# Patient Record
Sex: FEMALE | Race: WHITE | NOT HISPANIC OR LATINO | URBAN - METROPOLITAN AREA
[De-identification: names, ages, dates, MRNs, and addresses within clinical notes are randomized per-mention and may not be internally consistent; named-entity substitution may affect disease eponyms.]

---

## 2019-04-29 ENCOUNTER — INPATIENT (INPATIENT)
Facility: HOSPITAL | Age: 44
LOS: 0 days | Discharge: HOME | DRG: 74 | End: 2019-04-30
Attending: INTERNAL MEDICINE | Admitting: HOSPITALIST
Payer: SELF-PAY

## 2019-04-29 VITALS
SYSTOLIC BLOOD PRESSURE: 172 MMHG | OXYGEN SATURATION: 98 % | HEIGHT: 58 IN | HEART RATE: 102 BPM | RESPIRATION RATE: 18 BRPM | WEIGHT: 125 LBS | TEMPERATURE: 99 F | DIASTOLIC BLOOD PRESSURE: 91 MMHG

## 2019-04-29 PROCEDURE — 99285 EMERGENCY DEPT VISIT HI MDM: CPT | Mod: 25

## 2019-04-29 PROCEDURE — 99053 MED SERV 10PM-8AM 24 HR FAC: CPT

## 2019-04-29 NOTE — ED ADULT TRIAGE NOTE - CHIEF COMPLAINT QUOTE
Patient A&Ox4 complaining of facial droop & dysphagia x1 day. Patient presents with right sided facial droop, Dr. Peterson called to bedside for eval.

## 2019-04-30 ENCOUNTER — TRANSCRIPTION ENCOUNTER (OUTPATIENT)
Age: 44
End: 2019-04-30

## 2019-04-30 VITALS
HEART RATE: 78 BPM | OXYGEN SATURATION: 99 % | RESPIRATION RATE: 17 BRPM | TEMPERATURE: 99 F | DIASTOLIC BLOOD PRESSURE: 62 MMHG | SYSTOLIC BLOOD PRESSURE: 124 MMHG

## 2019-04-30 DIAGNOSIS — Z98.891 HISTORY OF UTERINE SCAR FROM PREVIOUS SURGERY: Chronic | ICD-10-CM

## 2019-04-30 DIAGNOSIS — G51.0 BELL'S PALSY: ICD-10-CM

## 2019-04-30 DIAGNOSIS — I63.9 CEREBRAL INFARCTION, UNSPECIFIED: ICD-10-CM

## 2019-04-30 LAB
ALBUMIN SERPL ELPH-MCNC: 4.2 G/DL — SIGNIFICANT CHANGE UP (ref 3.3–5.2)
ALP SERPL-CCNC: 56 U/L — SIGNIFICANT CHANGE UP (ref 40–120)
ALT FLD-CCNC: 13 U/L — SIGNIFICANT CHANGE UP
ANION GAP SERPL CALC-SCNC: 15 MMOL/L — SIGNIFICANT CHANGE UP (ref 5–17)
APPEARANCE UR: CLEAR — SIGNIFICANT CHANGE UP
APTT BLD: 31.6 SEC — SIGNIFICANT CHANGE UP (ref 27.5–36.3)
AST SERPL-CCNC: 12 U/L — SIGNIFICANT CHANGE UP
BASOPHILS # BLD AUTO: 0 K/UL — SIGNIFICANT CHANGE UP (ref 0–0.2)
BASOPHILS NFR BLD AUTO: 0.2 % — SIGNIFICANT CHANGE UP (ref 0–2)
BILIRUB SERPL-MCNC: 0.3 MG/DL — LOW (ref 0.4–2)
BILIRUB UR-MCNC: NEGATIVE — SIGNIFICANT CHANGE UP
BUN SERPL-MCNC: 10 MG/DL — SIGNIFICANT CHANGE UP (ref 8–20)
CALCIUM SERPL-MCNC: 9.9 MG/DL — SIGNIFICANT CHANGE UP (ref 8.6–10.2)
CHLORIDE SERPL-SCNC: 98 MMOL/L — SIGNIFICANT CHANGE UP (ref 98–107)
CHOLEST SERPL-MCNC: 218 MG/DL — HIGH (ref 110–199)
CO2 SERPL-SCNC: 26 MMOL/L — SIGNIFICANT CHANGE UP (ref 22–29)
COLOR SPEC: YELLOW — SIGNIFICANT CHANGE UP
CREAT SERPL-MCNC: 0.42 MG/DL — LOW (ref 0.5–1.3)
DIFF PNL FLD: NEGATIVE — SIGNIFICANT CHANGE UP
EOSINOPHIL # BLD AUTO: 0.1 K/UL — SIGNIFICANT CHANGE UP (ref 0–0.5)
EOSINOPHIL NFR BLD AUTO: 1.2 % — SIGNIFICANT CHANGE UP (ref 0–6)
EPI CELLS # UR: SIGNIFICANT CHANGE UP
GLUCOSE BLDC GLUCOMTR-MCNC: 286 MG/DL — HIGH (ref 70–99)
GLUCOSE BLDC GLUCOMTR-MCNC: 304 MG/DL — HIGH (ref 70–99)
GLUCOSE SERPL-MCNC: 286 MG/DL — HIGH (ref 70–115)
GLUCOSE UR QL: 1000 MG/DL
HCT VFR BLD CALC: 42.7 % — SIGNIFICANT CHANGE UP (ref 37–47)
HDLC SERPL-MCNC: 45 MG/DL — LOW
HGB BLD-MCNC: 14.5 G/DL — SIGNIFICANT CHANGE UP (ref 12–16)
INR BLD: 0.91 RATIO — SIGNIFICANT CHANGE UP (ref 0.88–1.16)
KETONES UR-MCNC: NEGATIVE — SIGNIFICANT CHANGE UP
LEUKOCYTE ESTERASE UR-ACNC: NEGATIVE — SIGNIFICANT CHANGE UP
LIPID PNL WITH DIRECT LDL SERPL: 109 MG/DL — SIGNIFICANT CHANGE UP
LYMPHOCYTES # BLD AUTO: 2 K/UL — SIGNIFICANT CHANGE UP (ref 1–4.8)
LYMPHOCYTES # BLD AUTO: 21.9 % — SIGNIFICANT CHANGE UP (ref 20–55)
MCHC RBC-ENTMCNC: 27.3 PG — SIGNIFICANT CHANGE UP (ref 27–31)
MCHC RBC-ENTMCNC: 34 G/DL — SIGNIFICANT CHANGE UP (ref 32–36)
MCV RBC AUTO: 80.3 FL — LOW (ref 81–99)
MONOCYTES # BLD AUTO: 0.6 K/UL — SIGNIFICANT CHANGE UP (ref 0–0.8)
MONOCYTES NFR BLD AUTO: 6.2 % — SIGNIFICANT CHANGE UP (ref 3–10)
NEUTROPHILS # BLD AUTO: 6.3 K/UL — SIGNIFICANT CHANGE UP (ref 1.8–8)
NEUTROPHILS NFR BLD AUTO: 70.4 % — SIGNIFICANT CHANGE UP (ref 37–73)
NITRITE UR-MCNC: NEGATIVE — SIGNIFICANT CHANGE UP
PH UR: 7 — SIGNIFICANT CHANGE UP (ref 5–8)
PLATELET # BLD AUTO: 339 K/UL — SIGNIFICANT CHANGE UP (ref 150–400)
POTASSIUM SERPL-MCNC: 3.5 MMOL/L — SIGNIFICANT CHANGE UP (ref 3.5–5.3)
POTASSIUM SERPL-SCNC: 3.5 MMOL/L — SIGNIFICANT CHANGE UP (ref 3.5–5.3)
PROT SERPL-MCNC: 8.9 G/DL — HIGH (ref 6.6–8.7)
PROT UR-MCNC: 15 MG/DL
PROTHROM AB SERPL-ACNC: 10.4 SEC — SIGNIFICANT CHANGE UP (ref 10–12.9)
RBC # BLD: 5.32 M/UL — HIGH (ref 4.4–5.2)
RBC # FLD: 13 % — SIGNIFICANT CHANGE UP (ref 11–15.6)
RBC CASTS # UR COMP ASSIST: SIGNIFICANT CHANGE UP /HPF (ref 0–4)
SODIUM SERPL-SCNC: 139 MMOL/L — SIGNIFICANT CHANGE UP (ref 135–145)
SP GR SPEC: 1 — LOW (ref 1.01–1.02)
TOTAL CHOLESTEROL/HDL RATIO MEASUREMENT: 5 RATIO — SIGNIFICANT CHANGE UP (ref 3.3–7.1)
TRIGL SERPL-MCNC: 320 MG/DL — HIGH (ref 10–200)
UROBILINOGEN FLD QL: NEGATIVE MG/DL — SIGNIFICANT CHANGE UP
WBC # BLD: 9 K/UL — SIGNIFICANT CHANGE UP (ref 4.8–10.8)
WBC # FLD AUTO: 9 K/UL — SIGNIFICANT CHANGE UP (ref 4.8–10.8)
WBC UR QL: NEGATIVE — SIGNIFICANT CHANGE UP

## 2019-04-30 PROCEDURE — 70498 CT ANGIOGRAPHY NECK: CPT | Mod: 26

## 2019-04-30 PROCEDURE — 70450 CT HEAD/BRAIN W/O DYE: CPT

## 2019-04-30 PROCEDURE — 85027 COMPLETE CBC AUTOMATED: CPT

## 2019-04-30 PROCEDURE — 70450 CT HEAD/BRAIN W/O DYE: CPT | Mod: 26,59

## 2019-04-30 PROCEDURE — 93010 ELECTROCARDIOGRAM REPORT: CPT

## 2019-04-30 PROCEDURE — 70496 CT ANGIOGRAPHY HEAD: CPT

## 2019-04-30 PROCEDURE — 82164 ANGIOTENSIN I ENZYME TEST: CPT

## 2019-04-30 PROCEDURE — 82962 GLUCOSE BLOOD TEST: CPT

## 2019-04-30 PROCEDURE — 93005 ELECTROCARDIOGRAM TRACING: CPT

## 2019-04-30 PROCEDURE — 99285 EMERGENCY DEPT VISIT HI MDM: CPT | Mod: 25

## 2019-04-30 PROCEDURE — 86618 LYME DISEASE ANTIBODY: CPT

## 2019-04-30 PROCEDURE — 12345: CPT | Mod: NC

## 2019-04-30 PROCEDURE — 85610 PROTHROMBIN TIME: CPT

## 2019-04-30 PROCEDURE — 70551 MRI BRAIN STEM W/O DYE: CPT

## 2019-04-30 PROCEDURE — 80053 COMPREHEN METABOLIC PANEL: CPT

## 2019-04-30 PROCEDURE — 80061 LIPID PANEL: CPT

## 2019-04-30 PROCEDURE — 85730 THROMBOPLASTIN TIME PARTIAL: CPT

## 2019-04-30 PROCEDURE — 36415 COLL VENOUS BLD VENIPUNCTURE: CPT

## 2019-04-30 PROCEDURE — 81001 URINALYSIS AUTO W/SCOPE: CPT

## 2019-04-30 PROCEDURE — 70496 CT ANGIOGRAPHY HEAD: CPT | Mod: 26

## 2019-04-30 PROCEDURE — 99223 1ST HOSP IP/OBS HIGH 75: CPT

## 2019-04-30 PROCEDURE — 70498 CT ANGIOGRAPHY NECK: CPT

## 2019-04-30 PROCEDURE — 70551 MRI BRAIN STEM W/O DYE: CPT | Mod: 26

## 2019-04-30 RX ORDER — DEXTROSE 50 % IN WATER 50 %
25 SYRINGE (ML) INTRAVENOUS ONCE
Qty: 0 | Refills: 0 | Status: DISCONTINUED | OUTPATIENT
Start: 2019-04-30 | End: 2019-04-30

## 2019-04-30 RX ORDER — ATORVASTATIN CALCIUM 80 MG/1
40 TABLET, FILM COATED ORAL AT BEDTIME
Qty: 0 | Refills: 0 | Status: DISCONTINUED | OUTPATIENT
Start: 2019-04-30 | End: 2019-04-30

## 2019-04-30 RX ORDER — DEXTROSE 50 % IN WATER 50 %
12.5 SYRINGE (ML) INTRAVENOUS ONCE
Qty: 0 | Refills: 0 | Status: DISCONTINUED | OUTPATIENT
Start: 2019-04-30 | End: 2019-04-30

## 2019-04-30 RX ORDER — GLUCAGON INJECTION, SOLUTION 0.5 MG/.1ML
1 INJECTION, SOLUTION SUBCUTANEOUS ONCE
Qty: 0 | Refills: 0 | Status: DISCONTINUED | OUTPATIENT
Start: 2019-04-30 | End: 2019-04-30

## 2019-04-30 RX ORDER — HEPARIN SODIUM 5000 [USP'U]/ML
5000 INJECTION INTRAVENOUS; SUBCUTANEOUS EVERY 8 HOURS
Qty: 0 | Refills: 0 | Status: DISCONTINUED | OUTPATIENT
Start: 2019-04-30 | End: 2019-04-30

## 2019-04-30 RX ORDER — INFLUENZA VIRUS VACCINE 15; 15; 15; 15 UG/.5ML; UG/.5ML; UG/.5ML; UG/.5ML
0.5 SUSPENSION INTRAMUSCULAR ONCE
Qty: 0 | Refills: 0 | Status: DISCONTINUED | OUTPATIENT
Start: 2019-04-30 | End: 2019-04-30

## 2019-04-30 RX ORDER — ASPIRIN/CALCIUM CARB/MAGNESIUM 324 MG
325 TABLET ORAL ONCE
Qty: 0 | Refills: 0 | Status: COMPLETED | OUTPATIENT
Start: 2019-04-30 | End: 2019-04-30

## 2019-04-30 RX ORDER — INSULIN LISPRO 100/ML
VIAL (ML) SUBCUTANEOUS
Qty: 0 | Refills: 0 | Status: DISCONTINUED | OUTPATIENT
Start: 2019-04-30 | End: 2019-04-30

## 2019-04-30 RX ORDER — ASPIRIN/CALCIUM CARB/MAGNESIUM 324 MG
81 TABLET ORAL DAILY
Qty: 0 | Refills: 0 | Status: DISCONTINUED | OUTPATIENT
Start: 2019-05-01 | End: 2019-04-30

## 2019-04-30 RX ORDER — VALACYCLOVIR 500 MG/1
1 TABLET, FILM COATED ORAL
Qty: 21 | Refills: 0 | OUTPATIENT
Start: 2019-04-30 | End: 2019-05-06

## 2019-04-30 RX ORDER — DEXTROSE 50 % IN WATER 50 %
15 SYRINGE (ML) INTRAVENOUS ONCE
Qty: 0 | Refills: 0 | Status: DISCONTINUED | OUTPATIENT
Start: 2019-04-30 | End: 2019-04-30

## 2019-04-30 RX ORDER — SODIUM CHLORIDE 9 MG/ML
1000 INJECTION, SOLUTION INTRAVENOUS
Qty: 0 | Refills: 0 | Status: DISCONTINUED | OUTPATIENT
Start: 2019-04-30 | End: 2019-04-30

## 2019-04-30 RX ORDER — PANTOPRAZOLE SODIUM 20 MG/1
1 TABLET, DELAYED RELEASE ORAL
Qty: 15 | Refills: 0 | OUTPATIENT
Start: 2019-04-30 | End: 2019-05-29

## 2019-04-30 RX ADMIN — Medication 325 MILLIGRAM(S): at 02:39

## 2019-04-30 RX ADMIN — Medication 60 MILLIGRAM(S): at 04:43

## 2019-04-30 RX ADMIN — HEPARIN SODIUM 5000 UNIT(S): 5000 INJECTION INTRAVENOUS; SUBCUTANEOUS at 05:34

## 2019-04-30 RX ADMIN — Medication 3: at 07:52

## 2019-04-30 RX ADMIN — Medication 1 DROP(S): at 05:34

## 2019-04-30 RX ADMIN — Medication 4: at 11:46

## 2019-04-30 NOTE — H&P ADULT - HISTORY OF PRESENT ILLNESS
Pt is a 45 yo F presenting from home with R sided facial droop and slurred speech. PMH DM2, HTN.   Patient presents from home after she noted R sided facial asymmetry on Sunday morning at approximately 10 AM. She states she woke up with it. She did not come to the hospital earlier due to insurance issues. Patient also has some difficulty speaking, she notes that she is producing decreased tears and saliva. She denies any weakness in her arms or legs, no light-headedness, dizziness, hearing problems, no hx of TIA or stroke. She has no other complaints.   Denies headaches, nausea, vomiting, chest pain, SOB, palpitations, abdominal pain, constipation, diarrhea, melena, hematochezia, dysuria.   In ED there was concern for stroke as she reportedly had expressive aphasia. CT head is negative for acute CVA, CTA Head and neck shows no acute abnormality.   Labs show elevated glucose and dyslipidemia.

## 2019-04-30 NOTE — DISCHARGE NOTE NURSING/CASE MANAGEMENT/SOCIAL WORK - NSDCDPATPORTLINK_GEN_ALL_CORE
You can access the [facility] Patient Portal, offered by [facility], by registering with the following website: [email or web address]

## 2019-04-30 NOTE — DISCHARGE NOTE PROVIDER - NSDCCPCAREPLAN_GEN_ALL_CORE_FT
PRINCIPAL DISCHARGE DIAGNOSIS  Diagnosis: Bell's palsy  Assessment and Plan of Treatment:       SECONDARY DISCHARGE DIAGNOSES  Diagnosis: Abnormal MRI  Assessment and Plan of Treatment:

## 2019-04-30 NOTE — DISCHARGE NOTE PROVIDER - CARE PROVIDER_API CALL
Ruiz Mccabe)  Neurology  [address] [place] [address]  Phone: [phone]  Fax: [phone]  Follow Up Time:     Otto Baca)  Otolaryngology  [facility] [place] [address]  Phone: [phone]  Fax: [phone]  Follow Up Time:     Antonio Barcenas)  Internal Medicine  [facility] [place] [address]  Phone: [phone]  Fax: [phone]  Follow Up Time:

## 2019-04-30 NOTE — DISCHARGE NOTE PROVIDER - PROVIDER TOKENS
PROVIDER:[TOKEN:[[id number]]],PROVIDER:[TOKEN:[[id number]]],PROVIDER:[TOKEN:[[id number]]]

## 2019-04-30 NOTE — H&P ADULT - NSHPPHYSICALEXAM_GEN_ALL_CORE
T(C): 37 (04-30-19 @ 03:45), Max: 37.1 (04-29-19 @ 23:05)  HR: 84 (04-30-19 @ 03:45) (84 - 102)  BP: 127/66 (04-30-19 @ 03:45) (127/66 - 172/91)  RR: 17 (04-30-19 @ 03:45) (17 - 18)  SpO2: 97% (04-30-19 @ 03:45) (97% - 99%)  Wt(kg): --    Physical Exam:   GENERAL: well-groomed, well-developed, NAD  HEENT: head NC/AT; EOM intact, PERRLA, conjunctiva & sclera clear; hearing grossly intact, moist mucous membranes  NECK: supple, no JVD  RESPIRATORY: CTA B/L, no wheezing, rales, rhonchi or rubs  CARDIOVASCULAR: S1&S2, RRR, no murmurs or gallops  ABDOMEN: soft, non-tender, non-distended, + Bowel sounds x4 quadrants, no guarding, rebound or rigidity  MUSCULOSKELETAL:  no clubbing, cyanosis or edema of all 4 extremities  LYMPH: no cervical lymphadenopathy  VASCULAR: Radial pulses 2+ bilaterally, no varicose veins   SKIN: warm and dry, color normal  NEUROLOGIC: AA&O X3, CN2-12 intact aside from R sided facial droop inability to completely close R eyelid and inability to wrinke R forehead,, no sensory loss, motor Strength 5/5 in UE & LE B/L, slightly slurred speech, no pronator drift  Psych: Normal mood and affect, normal behavior

## 2019-04-30 NOTE — ED ADULT NURSE NOTE - NSIMPLEMENTINTERV_GEN_ALL_ED
Implemented All Universal Safety Interventions:  [place] to call system. Call bell, personal items and telephone within reach. Instruct patient to call for assistance. Room bathroom lighting operational. Non-slip footwear when patient is off stretcher. Physically safe environment: no spills, clutter or unnecessary equipment. Stretcher in lowest position, wheels locked, appropriate side rails in place.

## 2019-04-30 NOTE — ED ADULT NURSE NOTE - CHPI ED NUR SYMPTOMS NEG
no loss of consciousness/no numbness/no change in level of consciousness/no dizziness/no fever/no blurred vision/no confusion

## 2019-04-30 NOTE — ED PROVIDER NOTE - OBJECTIVE STATEMENT
43 y/o F, with hx of HTN and DM, presents to the ED c/o right sided facial droop, onset yesterday morning.  Pt notes that she woke up with sx yesterday morning.  Pt noticed sx after attempting to drink water and had difficulty keeping the liquid in his mouth.  Family at bedside also notes that pt is having difficulty speaking.  Daughter states that pt did not immediately come to the ED for evaluation because she did not have her insurance card.  Pt decided to be evaluated tonight after coworkers suggested she come to the ED.  Denies fever, chills, difficulty swallowing, visual changes, cough, SOB, chest pain, N/V/D, back pain, or HA.

## 2019-04-30 NOTE — CONSULT NOTE ADULT - ASSESSMENT
Impression:  R [place] palsy, likely idiopathic.    Plan:    If MRI Brain is normal, then neuro stable to follow up as outpt at [facility].  Continue with artificial tears.  Medrol dose pack if medically stable due to DM and Valtrex for [place]idan palsy.  D/w pt and daughter in detail.  DVT prophylaxis as per medicine.  Outpt facial stimulation therapy.  Outpatient follow up at [facility] at [phone] or [phone]. Impression:  R [place] palsy, likely idiopathic.    Plan:    If MRI Brain is normal, then neuro stable to follow up as outpt at [facility].  Continue with artificial tears.  Recommend Medrol dose pack if medically stable due to DM and Valtrex for [place] palsy.  D/w pt and daughter in detail.  DVT prophylaxis as per medicine.  Outpt facial stimulation therapy.  Outpatient follow up at [facility] at [phone] or [phone]. Impression:  R [place] palsy, likely idiopathic.    Plan:    If MRI Brain is normal, then neuro stable to follow up as outpt at [facility].  Continue with artificial tears.  Recommend Medrol dose pack if medically stable due to DM and Valtrex for [place] palsy.  D/w pt and daughter in detail.  DVT prophylaxis as per medicine.  Outpt facial stimulation therapy.  Outpatient follow up at [facility] at [phone] or [phone].     Addendum:    MR Head No Cont (04.30.19 @ 09:09) >  No space-occupying lesion or acute infarct. No hemorrhage or   hydrocephalus.  Scattered chronic mucosal thickening of the paranasal sinuses, no air   fluid levels.  Nonspecific prominence of the nasopharyngeal soft tissues and small left   lateral retropharyngeal lymph node, correlation with endoscopy is   suggested    Plan:  Neuro stable.  Consider ENT as suggetsed above.  Outpatient follow up at [facility] at [phone] or [phone].   Reconsult PRN.

## 2019-04-30 NOTE — DISCHARGE NOTE PROVIDER - HOSPITAL COURSE
Patient: BHUMI SAMUEL [id number]                   Internal Medicine Hospitalist Discharge Note    HPI:    Pt is a 43 yo F presenting from home with R sided facial droop and slurred speech. PMH DM2, HTN.  Seen by Neurology.  MRI and CT did not reveal CVA.  Symptoms attributed to Bell's Palsy.  Pt tolerating po.  No extremity wekaness/ numbness.  On MRI incidentally found to have < from: MR Head No Cont (19 @ 09:09) >  Scattered chronic mucosal thickening of the paranasal sinuses, no air fluid levels.  Nonspecific prominence of the nasopharyngeal soft tissues and small left     lateral retropharyngeal lymph node, correlation with endoscopy is suggested        Seen with daughter at bedside.  NO new complaints.          ____________________PHYSICAL EXAM:    Vitals reviewed as indicated below    GENERAL:  NAD     HEENT: NCAT    CARDIOVASCULAR:  S1, S2    LUNGS: CTAB    ABDOMEN:  soft, (-) tenderness, (-) distension, (+) bowel sounds, (-) guarding, (-) rebound (-) rigidity    EXTREMITIES:  no cyanosis / clubbing / edema. NEURO: R facial weakness involving forehead.  EOMI. Speech fluent.     ____________________        VITALS:    Vital Signs Last 24 Hrs    T(C): 36.7 (2019 11:10), Max: 37.1 (2019 23:05)    T(F): 98 (2019 11:10), Max: 98.7 (2019 23:05)    HR: 83 (2019 11:10) (83 - 102)    BP: 121/74 (2019 11:10) (121/74 - 172/91)    BP(mean): --    RR: 18 (2019 11:10) (17 - 18)    SpO2: 100% (2019 11:10) (94% - 100%)   Daily Height in cm: 147.32 (2019 23:05)      Daily     CAPILLARY BLOOD GLUCOSE            POCT Blood Glucose.: 304 mg/dL (2019 11:45)    POCT Blood Glucose.: 286 mg/dL (2019 07:41)        I&O's Summary            LABS:                            14.5     9.0   )-----------( 339      ( 2019 01:35 )               42.7         04-        139  |  98  |  10.0    ----------------------------<  286<H>    3.5   |  26.0  |  0.42<L>        Ca    9.9      2019 01:35        TPro  8.9<H>  /  Alb  4.2  /  TBili  0.3<L>  /  DBili  x   /  AST  12  /  ALT  13  /  AlkPhos  56  -30         PT/INR - ( 2019 01:35 )   PT: 10.4 sec;   INR: 0.91 ratio               PTT - ( 2019 01:35 )  PTT:31.6 sec    LIVER FUNCTIONS - ( 2019 01:35 )    Alb: 4.2 g/dL / Pro: 8.9 g/dL / ALK PHOS: 56 U/L / ALT: 13 U/L / AST: 12 U/L / GGT: x               Urinalysis Basic - ( 2019 01:48 )        Color: Yellow / Appearance: Clear / S.005 / pH: x    Gluc: x / Ketone: Negative  / Bili: Negative / Urobili: Negative mg/dL     Blood: x / Protein: 15 mg/dL / Nitrite: Negative     Leuk Esterase: Negative / RBC: 0-2 /HPF / WBC Negative     Sq Epi: x / Non Sq Epi: Occasional / Bacteria: x                > Bell's Palsy - Neuro input appreciated.  Continue Valtrex, Medrol Dosepak.  OUtpatient facial stimulation therapy.    > Nasopharyngeal prominence and adenopathy - Discussed etiologies including malignancy.  Emphasized outpatient ENT followup.    > Diabetes Type II - monitor fingersticks.  Diet control.    > Essential HTN - Monitor BP, diet control.         Disposition: Stable for discharge.  Outpatient followup discussed.    Total time spent on discharge is  35  minutes.

## 2019-04-30 NOTE — DISCHARGE NOTE PROVIDER - NSDCFUADDINST_GEN_ALL_CORE_FT
It is important to see your primary physician as well as the physicians noted below within the next week to perform a comprehensive medical review.  Call their offices for an appointment as soon as you leave the hospital.  If you do not have a primary physician, contact the [facility] [phone] located on [address] [place] [address].  Your medical issues appear to be stable at this time, but if your symptoms recur or worsen, contact your physicians and/or return to the hospital if necessary.  If you encounter any issues or questions with your medication, call your physicians before stopping the medication.  Do not drive.  Limit your diet to 2 grams of sodium daily.

## 2019-04-30 NOTE — ED ADULT NURSE NOTE - OBJECTIVE STATEMENT
Patient presents to Ed with c/o  right sided facial droop and  dysphagia that began 1 day ago. Patient presents to ED A/Ox4, VSS, denies chest pain or sob.  Respirations are even and unlabored, lungs cta, +bowel x4 quads, abdomen soft, nontender/nondistended, skin w/d/i.

## 2019-04-30 NOTE — H&P ADULT - ASSESSMENT
Pt is a 45 yo F presenting from home with R sided facial droop and slurred speech. PMH DM2, HTN.     R sided facial asymmetry highly suspect Bell's palsy in this patient with R sided facial asymmetry inability to wrinkle R side of forehead, inability to completely close R eyelid and decreased lacrimation and salivation.   -previous report of expressive aphasia not appreciated by me.  was used and patient able to answer all questions appropriately but there was a component of slightly slurred speech.   -due to HLD will start statin. place on stroke pathway due to initial concern for CVA by ED attending.   -q2h neurocheck, dysphagia screening, start ASA and statin, will not order TTE at this time until further neurology input.   -will start PO prednisone and check lyme titer. Artificial tears and R sided eye patch.   -await further neuro input. [facility] neuro consulted.     HTN: Permissive HTN    HLD: start statin    DM2: suspect poorly controlled HTN  -check A1c  -SSI    Patient is unsure of any of the medications she is taking. She will have her daughter bring a detailed list of names of medication and dosages.     DVT ppx: heparin    Full code.

## 2019-04-30 NOTE — CONSULT NOTE ADULT - SUBJECTIVE AND OBJECTIVE BOX
HPI: 44yFemale RH presenting from home yesterday with R sided facial droop and slurred speech since  morning upon awakening (2 days now). Pt also carries PMH DM2, HTN.  She states she woke up with it. She did not come to the hospital earlier due to insurance issues. Patient also has some difficulty speaking upon arrival, she notes that she is producing decreased tears and saliva. She denies any weakness in her arms or legs, no light-headedness, dizziness, hearing problems, no hx of TIA or stroke. She has no other complaints.   No reports of fevers/skin changes or hearing impediment.  Denies headaches, nausea, vomiting, chest pain, SOB, palpitations, abdominal pain, constipation, diarrhea, melena, hematochezia, dysuria. In ED there was concern for stroke as she reportedly had expressive aphasia. CT head is negative for acute CVA, CTA Head and neck shows no acute abnormality. Labs show elevated glucose and dyslipidemia. Seen at bedside with daughter who provides translation and pt follows accordingly.      PAST MEDICAL & SURGICAL HISTORY:  DM (diabetes mellitus)  HTN (hypertension)  H/O:     MEDICATIONS  (STANDING):  artificial tears (preservative free) Ophthalmic Solution 1 Drop(s) Right EYE three times a day  atorvastatin 40 milliGRAM(s) Oral at bedtime  dextrose 5%. 1000 milliLiter(s) (50 mL/Hr) IV Continuous <Continuous>  dextrose 50% Injectable 12.5 Gram(s) IV Push once  dextrose 50% Injectable 25 Gram(s) IV Push once  dextrose 50% Injectable 25 Gram(s) IV Push once  heparin  Injectable 5000 Unit(s) SubCutaneous every 8 hours  influenza   Vaccine 0.5 milliLiter(s) IntraMuscular once  insulin lispro (HumaLOG) corrective regimen sliding scale   SubCutaneous three times a day before meals    MEDICATIONS  (PRN):  dextrose 40% Gel 15 Gram(s) Oral once PRN Blood Glucose LESS THAN 70 milliGRAM(s)/deciliter  glucagon  Injectable 1 milliGRAM(s) IntraMuscular once PRN Glucose LESS THAN 70 milligrams/deciliter    Allergies    No Known Allergies    Intolerances      FAMILY HISTORY:  No pertinent family history in first degree relatives          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    REVIEW OF SYSTEMS:    As noted in the HPI.    VITAL SIGNS:  Vital Signs Last 24 Hrs  T(C): 36.2 (2019 07:53), Max: 37.1 (2019 23:05)  T(F): 97.1 (2019 07:53), Max: 98.7 (2019 23:05)  HR: 84 (2019 07:53) (84 - 102)  BP: 122/75 (2019 07:53) (122/75 - 172/91)  BP(mean): --  RR: 18 (:53) (17 - 18)  SpO2: 94% (2019 07:53) (94% - 99%)    PHYSICAL EXAMINATION:  General: Well-developed, well nourished, in no acute distress.  Eyes: Conjunctiva and sclera clear. Fundoscopic examination was deferred.  Neck: Supple.  Cardiac: +S1 & S2; Regular.  Chest: CTA b/l.    Musculoskeletal: No tenderness on palpation of spine.  No Brudzinski/Kernig's sign.    Neurologic:  - Mental Status:  Alert, awake, oriented to person, place, and time; Speech is fluent with intact naming, repetition, and comprehension  Cranial Nerves II-XII:  nl except R peripheral facial palsy.  - Motor:  Strength is 5/5 throughout.  There is no pronator drift.  Normal muscle bulk and tone throughout.  - Reflexes:  2+ and symmetric throughout.  - Sensory:  Intact and symmetric to light touch, and joint-position sense.  - Coordination:  No dysmetria/dysdiadochokinesis.   - Gait: Deferred.      LABS:                          14.5   9.0   )-----------( 339      ( 2019 01:35 )             42.7     2019 01:35    139    |  98     |  10.0   ----------------------------<  286    3.5     |  26.0   |  0.42     Ca    9.9        2019 01:35    TPro  8.9    /  Alb  4.2    /  TBili  0.3    /  DBili  x      /  AST  12     /  ALT  13     /  AlkPhos  56     2019 01:35    LIVER FUNCTIONS - ( 2019 01:35 )  Alb: 4.2 g/dL / Pro: 8.9 g/dL / ALK PHOS: 56 U/L / ALT: 13 U/L / AST: 12 U/L / GGT: x           PT/INR - ( 2019 01:35 )   PT: 10.4 sec;   INR: 0.91 ratio         PTT - ( 2019 01:35 )  PTT:31.6 sec      RADIOLOGY & ADDITIONAL STUDIES:      CT Head No Cont (19 @ 01:04) >  No acute intracranial hemorrhage or mass effect. Further evaluation with   MRI may be performed as clinically indicated.     CT Angio Head/Neck w/ IV Cont (19 @ 02:26) >  Patent cervical and intracranial vessels without evidence of abrupt   vessel cut off, hemodynamically significant stenosis, aneurysm, or   dissection.

## 2019-04-30 NOTE — DISCHARGE NOTE NURSING/CASE MANAGEMENT/SOCIAL WORK - NSDCPEPTSTRK_GEN_ALL_CORE
Stroke support groups for patients, families, and friends/Prescribed medications/Call 911 for stroke/Need for follow up after discharge/Stroke education booklet/Stroke warning signs and symptoms/Signs and symptoms of stroke/Risk factors for stroke

## 2019-04-30 NOTE — ED PROVIDER NOTE - NEUROLOGICAL, MLM
Alert and oriented, sensation intact bilaterally.  Strength 5/5 in extremities x4, total right facial weakness with right facial droop, expressive aphasia

## 2019-05-01 LAB
ACE SERPL-CCNC: 6 U/L — LOW (ref 14–82)
B BURGDOR C6 AB SER-ACNC: NEGATIVE — SIGNIFICANT CHANGE UP
B BURGDOR IGG+IGM SER-ACNC: 0.49 INDEX — SIGNIFICANT CHANGE UP (ref 0.01–0.89)

## 2019-11-07 NOTE — PATIENT PROFILE ADULT - NSPROPTRIGHTBILLOFRIGHTS_GEN_A_NUR
CC: Hypertension  C/o ankle edema on norvasc 10 mg, last dose yesterday am      Visit: Subsequent    SUBJECTIVE  HPI Mary Jo Coates is a 62 year old female who presents today for evaluation and treatment of hypertension. The problem has been present for several years.     Patient reports  Control has been great   Has been treated with medication - see meds list.  Cardiovascular risk factors: family history, sedentary life style and stress.  Associated symptoms: denies chest pain, palpitations, dyspnea,  orthopnea, PND and dizziness  Follows a low-fat diet: yes    Patient's barriers to care plan: No    Does the patient have the ability to self monitor/manage and comply to their treatment plan:Yes     Compliance to treatment plan: Yes    Medication adherence:  Patient reports adherence to dosing schedules Yes  Side effects of medication: No    She notes no other additional concerns at this time.    Patient's medications, allergies, past medical, surgical, social and family histories were reviewed and updated as appropriate.    Review of Systems   All other systems reviewed and are negative.      Objective   Vitals: /84   Pulse 70   Temp 97.9 °F (36.6 °C)   Resp 18   Ht 5' 4\" (1.626 m)   Wt 59.4 kg (131 lb)   BMI 22.49 kg/m²   BSA 1.63 m²   Physical Exam  Vitals signs and nursing note reviewed.   Constitutional:       Appearance: She is well-developed.   HENT:      Head: Normocephalic and atraumatic.   Eyes:      Conjunctiva/sclera: Conjunctivae normal.      Pupils: Pupils are equal, round, and reactive to light.   Neck:      Musculoskeletal: Normal range of motion and neck supple.   Cardiovascular:      Rate and Rhythm: Normal rate and regular rhythm.      Heart sounds: Normal heart sounds.   Pulmonary:      Effort: Pulmonary effort is normal.      Breath sounds: Normal breath sounds.   Abdominal:      General: Bowel sounds are normal.      Palpations: Abdomen is soft.   Musculoskeletal: Normal 
range of motion.   Skin:     General: Skin is warm and dry.   Neurological:      Mental Status: She is alert and oriented to person, place, and time.   Psychiatric:         Behavior: Behavior normal.         Thought Content: Thought content normal.         Judgment: Judgment normal.         Assessment   Problem List Items Addressed This Visit        Circulatory    HTN (hypertension) - Primary    Relevant Medications    hydrochlorothiazide (HYDRODIURIL) 25 MG tablet      Other Visit Diagnoses     Need for hepatitis C screening test        Relevant Orders    HEPATITIS C ANTIBODY WITH REFLEX      d/c norvasc per pt request   Refuses labs    Takes water pill from her friend      Medication changes: Yes.  Patient was advised on side effects and interactions of the new medication.  Also understands risks of not taking medications or adhering to medication schedule.    Follow up with me in 4 weeks  No additional care support required at this time    Discussed treatment options, plan with patient and all questions answered.  Also discussed her current diet and exercise requirements and encouraged healthy lifestyle to reduce potential for serious health complications.  
patient
right normal/left normal

## 2021-07-13 NOTE — ED ADULT NURSE NOTE - NS ED NURSE LEVEL OF CONSCIOUSNESS SPEECH
Chart reviewed with Dr. Andrew, patient to proceed with anticipated surgery without further cardiac diagnostic testing.   Speaking Coherently

## 2021-10-26 NOTE — PATIENT PROFILE ADULT - LAST ORAL INTAKE
Additional Notes: Patient consent was obtained to proceed with the visit and recommended plan of care after discussion of all risks and benefits, including the risks of COVID-19 exposure. Detail Level: Simple Render Risk Assessment In Note?: no 29-Apr-2019 20:43

## 2023-01-01 NOTE — PATIENT PROFILE ADULT - DOES PATIENT HAVE ADVANCE DIRECTIVE
no Check here if all serologies below were negative, non-reactive or immune. Otherwise select appropriate status.

## 2023-03-21 NOTE — CONSULT NOTE ADULT - CONSULT REQUESTED BY NAME
To confirm, Your doctor has instructed you that surgery is scheduled for: Tuesday 3/28/23    Pre-Op will call the afternoon prior to surgery between 4:00 and 6:00 PM with the final arrival time.  Monday 3/27/23    Please report to Registration at front of the hospital --it is [facility]    Do not eat or drink anything after midnight the night before your surgery - THIS INCLUDES  WATER, GUM, MINTS AND CANDY.    YOU MAY BRUSH YOUR TEETH     TAKE APPROVED  MEDICATIONS WITH A SMALL SIP OF WATER THE MORNING OF YOUR PROCEDURE: See Medication list    PLEASE NOTE:  The surgery schedule has many variables which may affect the time of your surgery case.  Family members should be available if your surgery time changes.  Plan to be here the day of your procedure between 4-6 hours.    DO NOT TAKE THESE MEDICATIONS 5-7 DAYS PRIOR to your procedure or per your surgeon's request: ASPIRIN, ALEVE, ADVIL, IBUPROFEN,  ADRIAN SELTZER, BC , FISH OIL , VITAMIN E, HERBALS  (May take Tylenol)      IMPORTANT INSTRUCTIONS    Do not smoke, vape or drink alcoholic beverages 24 hours prior to your procedure.  Shower the night before AND the morning of your procedure with a Chlorhexidine wash such as Hibiclens or Dial antibacterial soap from the neck down.    Do not get it on your face or in your eyes.  You may use your own shampoo and face wash. This helps your skin to be as bacteria free as possible.   Do not apply any deodorant, lotion or powder after you shower.   DO NOT remove hair from the surgery site.  Do not shave the incision site unless you are given specific instructions to do so.    Sleep in a bed with clean sheets.  Do not sleep with a pet in the bed.   If you wear contact lenses, dentures, hearing aids or glasses, bring a container to put them in during surgery and give to a family member for safe keeping.    Please leave all jewelry, piercing's and valuables at home.   If your doctor has scheduled you  MOISES Lovett MD for an overnight stay, bring a small overnight bag with any personal items you need.  Wear comfortable clothing that is easy to remove and place back on after surgery.     Make arrangements in advance for transportation home by a responsible adult.    You must make arrangements for transportation, TAXI'S, UBER'S OR LYFTS ARE NOT ALLOWED.      If you have any questions about these instructions, call Pre-Op Admit  Nursing at [phone] , Pre-Op Day Surgery Unit at [phone]

## 2025-05-28 NOTE — PATIENT PROFILE ADULT - NSPROPTRIGHTREPPHONE_GEN_A_NUR
Updated goals:  Continue protein powder in coffee if not hungry within 1 hour of waking up.  Keep a food log with measurements (consider tracking calories if not losing weight as expected).  Include both protein and high-fiber carbohydrate with meals and snacks.  Walk for 10 mins 5 days per week and increase as tolerated.  
[id number]